# Patient Record
Sex: FEMALE | Race: WHITE | NOT HISPANIC OR LATINO | Employment: FULL TIME | URBAN - METROPOLITAN AREA
[De-identification: names, ages, dates, MRNs, and addresses within clinical notes are randomized per-mention and may not be internally consistent; named-entity substitution may affect disease eponyms.]

---

## 2021-02-01 ENCOUNTER — HISTORICAL (OUTPATIENT)
Dept: ADMINISTRATIVE | Facility: HOSPITAL | Age: 46
End: 2021-02-01

## 2021-02-01 LAB
ALBUMIN SERPL-MCNC: 4.5 GM/DL (ref 3.4–5)
ALBUMIN/GLOB SERPL: 1.32 {RATIO} (ref 1.5–2.5)
ALP SERPL-CCNC: 57 UNIT/L (ref 38–126)
ALT SERPL-CCNC: 18 UNIT/L (ref 7–52)
AST SERPL-CCNC: 20 UNIT/L (ref 15–37)
BILIRUB SERPL-MCNC: 0.5 MG/DL (ref 0.2–1)
BILIRUBIN DIRECT+TOT PNL SERPL-MCNC: 0.1 MG/DL (ref 0–0.5)
BILIRUBIN DIRECT+TOT PNL SERPL-MCNC: 0.4 MG/DL
BUN SERPL-MCNC: 15 MG/DL (ref 7–18)
CALCIUM SERPL-MCNC: 9.9 MG/DL (ref 8.5–10)
CHLORIDE SERPL-SCNC: 99 MMOL/L (ref 98–107)
CHOLEST SERPL-MCNC: 209 MG/DL (ref 0–200)
CHOLEST/HDLC SERPL: 4.5 {RATIO}
CO2 SERPL-SCNC: 27 MMOL/L (ref 21–32)
CREAT SERPL-MCNC: 0.87 MG/DL (ref 0.6–1.3)
DEPRECATED CALCIDIOL+CALCIFEROL SERPL-MC: 20.3 NG/ML (ref 30–80)
GLOBULIN SER-MCNC: 3.4 GM/DL (ref 1.2–3)
GLUCOSE SERPL-MCNC: 83 MG/DL (ref 74–106)
HDLC SERPL-MCNC: 46 MG/DL (ref 35–60)
LDLC SERPL CALC-MCNC: 136 MG/DL (ref 0–129)
POTASSIUM SERPL-SCNC: 4 MMOL/L (ref 3.5–5.1)
PROT SERPL-MCNC: 7.9 GM/DL (ref 6.4–8.2)
SODIUM SERPL-SCNC: 135 MMOL/L (ref 136–145)
TRIGL SERPL-MCNC: 208 MG/DL (ref 30–150)
TSH SERPL-ACNC: 1.29 MIU/ML (ref 0.35–4.94)
VLDLC SERPL CALC-MCNC: 41.6 MG/DL

## 2021-10-08 ENCOUNTER — HISTORICAL (OUTPATIENT)
Dept: ADMINISTRATIVE | Facility: HOSPITAL | Age: 46
End: 2021-10-08

## 2021-11-15 ENCOUNTER — HISTORICAL (OUTPATIENT)
Dept: RADIOLOGY | Facility: HOSPITAL | Age: 46
End: 2021-11-15

## 2022-03-03 ENCOUNTER — HISTORICAL (OUTPATIENT)
Dept: ADMINISTRATIVE | Facility: HOSPITAL | Age: 47
End: 2022-03-03

## 2022-03-03 LAB — VIT B12 SERPL-MCNC: 528 PG/ML (ref 213–816)

## 2022-04-10 ENCOUNTER — HISTORICAL (OUTPATIENT)
Dept: ADMINISTRATIVE | Facility: HOSPITAL | Age: 47
End: 2022-04-10
Payer: COMMERCIAL

## 2022-04-28 VITALS
WEIGHT: 118.38 LBS | BODY MASS INDEX: 22.35 KG/M2 | DIASTOLIC BLOOD PRESSURE: 88 MMHG | HEIGHT: 61 IN | SYSTOLIC BLOOD PRESSURE: 130 MMHG

## 2022-08-17 ENCOUNTER — APPOINTMENT (OUTPATIENT)
Dept: RADIOLOGY | Facility: HOSPITAL | Age: 47
End: 2022-08-17
Payer: COMMERCIAL

## 2022-08-17 DIAGNOSIS — Z91.89 AT HIGH RISK FOR BREAST CANCER: ICD-10-CM

## 2022-08-17 PROCEDURE — 25500020 PHARM REV CODE 255

## 2022-08-17 PROCEDURE — 77049 MRI BREAST C-+ W/CAD BI: CPT | Mod: TC

## 2022-08-17 PROCEDURE — A9577 INJ MULTIHANCE: HCPCS

## 2022-08-17 PROCEDURE — 77049 MRI BREAST W/WO CONTRAST, W/CAD, BILATERAL: ICD-10-PCS | Mod: 26,,, | Performed by: RADIOLOGY

## 2022-08-17 PROCEDURE — 77049 MRI BREAST C-+ W/CAD BI: CPT | Mod: 26,,, | Performed by: RADIOLOGY

## 2022-08-17 RX ADMIN — GADOBENATE DIMEGLUMINE 15 ML: 529 INJECTION, SOLUTION INTRAVENOUS at 12:08

## 2022-08-31 PROBLEM — F41.1 GENERALIZED ANXIETY DISORDER: Status: ACTIVE | Noted: 2022-08-31

## 2022-08-31 PROBLEM — E03.9 HYPOTHYROIDISM: Status: ACTIVE | Noted: 2022-08-31

## 2022-08-31 PROBLEM — E55.9 VITAMIN D DEFICIENCY: Status: ACTIVE | Noted: 2022-08-31

## 2022-08-31 PROBLEM — G43.909 MIGRAINE HEADACHE: Status: ACTIVE | Noted: 2022-08-31

## 2022-08-31 PROBLEM — Z86.16 HISTORY OF SEVERE ACUTE RESPIRATORY SYNDROME CORONAVIRUS 2 (SARS-COV-2) DISEASE: Status: ACTIVE | Noted: 2022-08-31

## 2022-08-31 PROBLEM — R53.83 FATIGUE: Status: ACTIVE | Noted: 2022-08-31

## 2022-08-31 PROBLEM — J30.9 ALLERGIC RHINITIS: Status: ACTIVE | Noted: 2022-08-31

## 2022-11-17 ENCOUNTER — HOSPITAL ENCOUNTER (OUTPATIENT)
Dept: RADIOLOGY | Facility: HOSPITAL | Age: 47
Discharge: HOME | End: 2022-11-17
Payer: COMMERCIAL

## 2022-11-17 DIAGNOSIS — Z12.31 BREAST CANCER SCREENING BY MAMMOGRAM: ICD-10-CM

## 2022-11-17 PROCEDURE — 77063 MAMMO DIGITAL SCREENING BILAT WITH TOMO: ICD-10-PCS | Mod: 26,,, | Performed by: RADIOLOGY

## 2022-11-17 PROCEDURE — 77063 BREAST TOMOSYNTHESIS BI: CPT | Mod: 26,,, | Performed by: RADIOLOGY

## 2022-11-17 PROCEDURE — 77067 SCR MAMMO BI INCL CAD: CPT | Mod: 26,,, | Performed by: RADIOLOGY

## 2022-11-17 PROCEDURE — 77063 BREAST TOMOSYNTHESIS BI: CPT | Mod: TC

## 2022-11-17 PROCEDURE — 77067 SCR MAMMO BI INCL CAD: CPT | Mod: TC

## 2022-11-17 PROCEDURE — 77067 MAMMO DIGITAL SCREENING BILAT WITH TOMO: ICD-10-PCS | Mod: 26,,, | Performed by: RADIOLOGY

## 2022-12-14 NOTE — PROGRESS NOTES
[facility] Breast Surg  Breast Surgical Oncology  New Patient Office Visit - H&P      Referring Provider: Dr. Odin Hicks GYN    PCP: Will James MD     Chief Complaint:   Chief Complaint   Patient presents with    Follow-up     Follow up for MG results. Patient is well, no breast pain or concerns.     Subjective:      Interval History:   2022 She is doing well in the interval. She currently denies any breast issues including rashes, redness, pain, swelling, nipple discharge, or new lumps/masses. She had a Breast MRI in August and [place] [facility] [place] resulted as [place]. The Breast MRI resulted with an incidentally finding of Pectus excavatum.     History of Present Illness:  Gabi Garsia is a pleasant Female patient who initially presents on  22 at 46 Years old for evaluation and assessment of risk for breast cancer based on the Tyrer-Cuzick Breast Cancer Risk Model (>20% indicates elevated lifetime risk). Her lifetime risk is calculated to be: 31.1% v7 and 27.9% v8.     She currently denies any breast issues including rashes, redness, pain, swelling, nipple discharge, or new lumps/masses. Past Medical history includes: anxiety, hypothyroidism, and migraines.    Prevention - We had a brief discussion/education about indications for preventative mastectomy or chemoprevention.  These methods are not recommended for her at this time.    Imagin. 11[place]15/[place] [facility] [place]. No mammographic evidence of malignancy.    2. [place] [facility] [place]  Pectus excavatum is incidentally noted.: NEGATIVE No MR evidence of malignancy in either breast.RECOMMENDATIONS:  Continued annual mammography and supplemental breast screening with dynamic contrast enhanced breast MRI is recommended for this high-risk patient, preferably alternating mammography and MRI every 6 months.Routine annual screening mammography is due in 3 months unless clinical signs or  symptoms warrant earlier evaluation (November 2022).      3. 11/21/2022 BL SCR MG at [facility]- NEGATIVE. There is no mammographic evidence of malignancy.        Pathology:   N/a     OB / GYN History   Menarche Onset: 15  Menopause: Pre  Hormonal birth control (duration): 20years  Pregnancies: 4  Age at first child birth: 38  Child births: 2  Breastfeeding duration:  7months total  Hysterectomy: no  Oophorectomy: no  HRT: no     Family History of Cancer (& age at diagnosis):   -Sister Breast Cancer at age 59  - Grandmother maternal Breast cancer at age 55-zoila      Lifestyle:  Smoker: Smoking Status      Never (less than 100 in lifetime)  Height and Weight: 5 feet 2 inches. 119 lbs  ETOH consumption: yes, a couple times a year one glass of wine     Other:  # of breast biopsies (when and pathology results): none  MG breast density: BIRADS C, heterogenously dense  Prior thoracic RT: none  Genetic testing: none  Ashkenazi Baptist descent: No    Other History:     Past Medical History:   Diagnosis Date    Anxiety disorder, unspecified     Fatigue     History of COVID-19     Hypothyroidism, unspecified     Migraine     Other seasonal allergic rhinitis     Vitamin D deficiency         Past Surgical History:   Procedure Laterality Date    DILATION AND CURETTAGE OF UTERUS          Social History     Socioeconomic History    Marital status:    Tobacco Use    Smoking status: Never    Smokeless tobacco: Never   Substance and Sexual Activity    Drug use: Never        Immunization History   Administered Date(s) Administered    COVID-19, MRNA, LN-S, PF (Pfizer) (Purple Cap) 03/02/2021, 03/23/2021    Influenza (FLUBLOK) - Quadrivalent - Recombinant - PF *Preferred* (egg allergy) 11/10/2022    Influenza - Quadrivalent - PF *Preferred* (6 months and older) 08/12/2018, 09/19/2019, 12/17/2020    Tdap 07/05/2018        Medications/Allergies:    Current Outpatient Medications on File Prior to Visit   Medication Sig Dispense Refill     ergocalciferol (ERGOCALCIFEROL) 50,000 unit Cap Take 1 capsule (50,000 Units total) by mouth every 7 days. 12 capsule 1    fexofenadine (ALLEGRA) 180 MG tablet Take 180 mg by mouth once daily.      fluticasone propionate (FLONASE) 50 mcg/actuation nasal spray 1 spray by Each Nostril route 2 (two) times daily.      NP THYROID 30 mg Tab       butalbital-aspirin-caffeine -40 mg (FIORINAL) -40 mg Cap Take 1 capsule by mouth every 4 (four) hours as needed.      [DISCONTINUED] amitriptyline (ELAVIL) 10 MG tablet Take 1 tablet (10 mg total) by mouth nightly as needed for Insomnia. 7 tablet 1    [DISCONTINUED] ergocalciferol (ERGOCALCIFEROL) 50,000 unit Cap Take 50,000 Units by mouth every 7 days.      [DISCONTINUED] levothyroxine (SYNTHROID) 50 MCG tablet Take 50 mcg by mouth every morning.       No current facility-administered medications on file prior to visit.        Review of patient's allergies indicates:   Allergen Reactions    Sulfa (sulfonamide antibiotics) Rash        Review of Systems:      Comprehensive ROS normal except: see HPI       Objective/Physical Exam     Vitals:    12/22/22 1305   BP: 129/79   Pulse: 99   Resp: 18   Temp: 97.4 °F (36.3 °C)        General: The patient is awake, alert and oriented times three. The patient is well nourished and in no acute distress.  Neck: There is no evidence of palpable cervical, supraclavicular or axillary adenopathy. The neck is supple.   Musculoskeletal: The patient has a normal range of motion of her bilateral upper extremities.  Chest: Examination of the chest wall fails to reveal any obvious abnormalities. Nonlabored breathing, symmetric expansion.  Breast:  Right: Examination of right breast fails to reveal any dominant masses or areas of significant focal nodularity. The nipple is everted without evidence of discharge. There is no skin dimpling with movement of the pectoralis. There are no significant skin changes overlying the breast.   Left:  Examination of the left breast fails to reveal any dominant masses or areas of significant focal nodularity. The nipple is everted without evidence of discharge. There is no skin dimpling with movement of the pectoralis. There are no significant skin changes overlying the breast.  Abdomen: The abdomen is soft, flat, nontender and nondistended.  Integumentary: no rashes or skin lesions present  Neurologic: cranial nerves intact, no signs of peripheral neurological deficit, motor/sensory function intact       Assessment and Plan     Patient Active Problem List   Diagnosis    Vitamin D deficiency    Migraine headache    Hypothyroidism    Fatigue    History of severe acute respiratory syndrome coronavirus 2 (SARS-CoV-2) disease    Allergic rhinitis    Generalized anxiety disorder        Gabi was seen today for follow-up.    Diagnoses and all orders for this visit:    At high risk for breast cancer  -     MRI Breast w/wo Contrast, w/CAD, Bilateral; Future    Family history of breast cancer  -     MRI Breast w/wo Contrast, w/CAD, Bilateral; Future    Screening mammogram for breast cancer  -     Mammo Digital Screening Bilat w/ Joel; Future       ---------------------------------------------------------------------------------------------------------------    PLAN:     1. Lifestyle - Healthy lifestyle guidelines were reviewed. She was encouraged to engage in regular exercise, maintain a healthy body weight, and avoid excessive alcohol consumption. Healthy nutritional guidelines were also discussed. Self-breast examination was reviewed with the patient in detail and she was encouraged to perform this on a monthly basis.      2. MRI in August     3. SCR MG in December to try to spread out the patients imaging to a 6 month interval.     4. RTC  January 5.According to NCCN guidelines, she currently meets criteria for genetic testing. Patient states she will think about it and let me know at the next visit what she  decides.    6. Continue to see OB/GYN for CBE. Recommend two CBE a year. One with us and one with your OB/GYN Provider. We recommend them to be at a six month interval.  She states she sees her GYN in the summers.     7. Follow up with PCP regarding incidental finding of Pectus excavatum. MARIE Valenzuela to send recent office notes and imaging (including breast MRI to his office).     All of her questions were answered.      JOSELIN Del Real

## 2022-12-22 ENCOUNTER — OFFICE VISIT (OUTPATIENT)
Dept: SURGERY | Facility: CLINIC | Age: 47
End: 2022-12-22
Payer: COMMERCIAL

## 2022-12-22 VITALS
OXYGEN SATURATION: 100 % | HEART RATE: 99 BPM | SYSTOLIC BLOOD PRESSURE: 129 MMHG | DIASTOLIC BLOOD PRESSURE: 79 MMHG | TEMPERATURE: 97 F | WEIGHT: 119.38 LBS | HEIGHT: 61 IN | RESPIRATION RATE: 18 BRPM | BODY MASS INDEX: 22.54 KG/M2

## 2022-12-22 DIAGNOSIS — Z12.31 SCREENING MAMMOGRAM FOR BREAST CANCER: ICD-10-CM

## 2022-12-22 DIAGNOSIS — Z80.3 FAMILY HISTORY OF BREAST CANCER: ICD-10-CM

## 2022-12-22 DIAGNOSIS — Z91.89 AT HIGH RISK FOR BREAST CANCER: Primary | ICD-10-CM

## 2022-12-22 PROCEDURE — 1159F PR MEDICATION LIST DOCUMENTED IN MEDICAL RECORD: ICD-10-PCS | Mod: CPTII,S$GLB,,

## 2022-12-22 PROCEDURE — 99999 PR PBB SHADOW E&M-EST. PATIENT-LVL V: CPT | Mod: PBBFAC,,,

## 2022-12-22 PROCEDURE — 99999 PR PBB SHADOW E&M-EST. PATIENT-LVL V: ICD-10-PCS | Mod: PBBFAC,,,

## 2022-12-22 PROCEDURE — 3008F PR BODY MASS INDEX (BMI) DOCUMENTED: ICD-10-PCS | Mod: CPTII,S$GLB,,

## 2022-12-22 PROCEDURE — 99213 PR OFFICE/OUTPT VISIT, EST, LEVL III, 20-29 MIN: ICD-10-PCS | Mod: S$GLB,,,

## 2022-12-22 PROCEDURE — 99213 OFFICE O/P EST LOW 20 MIN: CPT | Mod: S$GLB,,,

## 2022-12-22 PROCEDURE — 3078F DIAST BP <80 MM HG: CPT | Mod: CPTII,S$GLB,,

## 2022-12-22 PROCEDURE — 3074F PR MOST RECENT SYSTOLIC BLOOD PRESSURE < 130 MM HG: ICD-10-PCS | Mod: CPTII,S$GLB,,

## 2022-12-22 PROCEDURE — 1159F MED LIST DOCD IN RCRD: CPT | Mod: CPTII,S$GLB,,

## 2022-12-22 PROCEDURE — 3078F PR MOST RECENT DIASTOLIC BLOOD PRESSURE < 80 MM HG: ICD-10-PCS | Mod: CPTII,S$GLB,,

## 2022-12-22 PROCEDURE — 3074F SYST BP LT 130 MM HG: CPT | Mod: CPTII,S$GLB,,

## 2022-12-22 PROCEDURE — 3008F BODY MASS INDEX DOCD: CPT | Mod: CPTII,S$GLB,,

## 2022-12-22 RX ORDER — MINERAL OIL
180 ENEMA (ML) RECTAL DAILY
COMMUNITY

## 2023-03-16 DIAGNOSIS — R10.9 FLANK PAIN: Primary | ICD-10-CM

## 2023-03-16 DIAGNOSIS — R31.9 HEMATURIA: ICD-10-CM

## 2023-07-06 DIAGNOSIS — Z80.3 FAMILY HISTORY OF BREAST CANCER: ICD-10-CM

## 2023-07-06 DIAGNOSIS — Z12.31 SCREENING MAMMOGRAM FOR BREAST CANCER: Primary | ICD-10-CM

## 2023-07-06 DIAGNOSIS — Z91.89 AT HIGH RISK FOR BREAST CANCER: ICD-10-CM

## 2023-11-22 ENCOUNTER — HOSPITAL ENCOUNTER (OUTPATIENT)
Dept: RADIOLOGY | Facility: HOSPITAL | Age: 48
Discharge: HOME | End: 2023-11-22
Payer: COMMERCIAL

## 2023-11-22 VITALS — BODY MASS INDEX: 22.45 KG/M2 | HEIGHT: 62 IN | WEIGHT: 122 LBS

## 2023-11-22 DIAGNOSIS — Z80.3 FAMILY HISTORY OF BREAST CANCER: ICD-10-CM

## 2023-11-22 DIAGNOSIS — Z91.89 AT HIGH RISK FOR BREAST CANCER: ICD-10-CM

## 2023-11-22 DIAGNOSIS — Z12.31 SCREENING MAMMOGRAM FOR BREAST CANCER: ICD-10-CM

## 2023-11-22 PROCEDURE — 77063 BREAST TOMOSYNTHESIS BI: CPT | Mod: 26,,, | Performed by: STUDENT IN AN ORGANIZED HEALTH CARE EDUCATION/TRAINING PROGRAM

## 2023-11-22 PROCEDURE — 77063 MAMMO DIGITAL SCREENING BILAT WITH TOMO: ICD-10-PCS | Mod: 26,,, | Performed by: STUDENT IN AN ORGANIZED HEALTH CARE EDUCATION/TRAINING PROGRAM

## 2023-11-22 PROCEDURE — 76641 US BREAST SCREENING BILATERAL: ICD-10-PCS | Mod: 26,50,, | Performed by: STUDENT IN AN ORGANIZED HEALTH CARE EDUCATION/TRAINING PROGRAM

## 2023-11-22 PROCEDURE — 77067 SCR MAMMO BI INCL CAD: CPT | Mod: 26,,, | Performed by: STUDENT IN AN ORGANIZED HEALTH CARE EDUCATION/TRAINING PROGRAM

## 2023-11-22 PROCEDURE — 77067 MAMMO DIGITAL SCREENING BILAT WITH TOMO: ICD-10-PCS | Mod: 26,,, | Performed by: STUDENT IN AN ORGANIZED HEALTH CARE EDUCATION/TRAINING PROGRAM

## 2023-11-22 PROCEDURE — 77067 SCR MAMMO BI INCL CAD: CPT | Mod: TC

## 2023-11-22 PROCEDURE — 76641 ULTRASOUND BREAST COMPLETE: CPT | Mod: TC,50

## 2023-11-22 PROCEDURE — 76641 ULTRASOUND BREAST COMPLETE: CPT | Mod: 26,50,, | Performed by: STUDENT IN AN ORGANIZED HEALTH CARE EDUCATION/TRAINING PROGRAM

## 2024-01-03 ENCOUNTER — OFFICE VISIT (OUTPATIENT)
Dept: SURGERY | Facility: CLINIC | Age: 49
End: 2024-01-03
Payer: COMMERCIAL

## 2024-01-03 VITALS
TEMPERATURE: 98 F | HEART RATE: 74 BPM | HEIGHT: 62 IN | OXYGEN SATURATION: 100 % | WEIGHT: 120.19 LBS | SYSTOLIC BLOOD PRESSURE: 132 MMHG | DIASTOLIC BLOOD PRESSURE: 84 MMHG | BODY MASS INDEX: 22.12 KG/M2 | RESPIRATION RATE: 18 BRPM

## 2024-01-03 DIAGNOSIS — Z91.89 AT HIGH RISK FOR BREAST CANCER: ICD-10-CM

## 2024-01-03 DIAGNOSIS — Z80.3 FAMILY HISTORY OF BREAST CANCER: ICD-10-CM

## 2024-01-03 DIAGNOSIS — Z12.39 ENCOUNTER FOR BREAST CANCER SCREENING OTHER THAN MAMMOGRAM: ICD-10-CM

## 2024-01-03 DIAGNOSIS — Z12.31 SCREENING MAMMOGRAM FOR BREAST CANCER: Primary | ICD-10-CM

## 2024-01-03 PROCEDURE — 99214 OFFICE O/P EST MOD 30 MIN: CPT | Mod: S$GLB,,, | Performed by: STUDENT IN AN ORGANIZED HEALTH CARE EDUCATION/TRAINING PROGRAM

## 2024-01-03 PROCEDURE — 1160F RVW MEDS BY RX/DR IN RCRD: CPT | Mod: CPTII,S$GLB,, | Performed by: STUDENT IN AN ORGANIZED HEALTH CARE EDUCATION/TRAINING PROGRAM

## 2024-01-03 PROCEDURE — 99999 PR PBB SHADOW E&M-EST. PATIENT-LVL IV: CPT | Mod: PBBFAC,,, | Performed by: STUDENT IN AN ORGANIZED HEALTH CARE EDUCATION/TRAINING PROGRAM

## 2024-01-03 PROCEDURE — 3075F SYST BP GE 130 - 139MM HG: CPT | Mod: CPTII,S$GLB,, | Performed by: STUDENT IN AN ORGANIZED HEALTH CARE EDUCATION/TRAINING PROGRAM

## 2024-01-03 PROCEDURE — 3079F DIAST BP 80-89 MM HG: CPT | Mod: CPTII,S$GLB,, | Performed by: STUDENT IN AN ORGANIZED HEALTH CARE EDUCATION/TRAINING PROGRAM

## 2024-01-03 PROCEDURE — 3008F BODY MASS INDEX DOCD: CPT | Mod: CPTII,S$GLB,, | Performed by: STUDENT IN AN ORGANIZED HEALTH CARE EDUCATION/TRAINING PROGRAM

## 2024-01-03 PROCEDURE — 1159F MED LIST DOCD IN RCRD: CPT | Mod: CPTII,S$GLB,, | Performed by: STUDENT IN AN ORGANIZED HEALTH CARE EDUCATION/TRAINING PROGRAM

## 2024-01-03 NOTE — PROGRESS NOTES
[facility]  Breast Surgical Oncology  Est Patient Office Visit - H&P      Referring Provider: Dr. Odin Hicks GYN    PCP: Will James MD     Chief Complaint:   Chief Complaint   Patient presents with    Follow-up     1 year High Risk follow up     Subjective:      Interval History:   Gabi Garsia is a 48 y.o.female who is here 2024 or high-risk follow-up.  She is doing well.  She denies any changes with her breasts including new masses, skin changes, skin lesions, nipple discharge or retraction.  She had her bilateral screening mammogram and US in November which did not show any suspicious findings.  She did not get her MRI this past summer because it was very expensive. No changes in her family history since her last visit. She does not exercise regularly. States her periods have gotten more irregular. Denies any menopausal symptoms.     History of Present Illness:  Gabi Garsia is a pleasant Female patient who initially presents on 22 at 46 Years old for evaluation and assessment of risk for breast cancer based on the Tyrer-Cuzick Breast Cancer Risk Model (>20% indicates elevated lifetime risk). Her lifetime risk is calculated to be: 31.1% v7 and 27.9% v8.     She currently denies any breast issues including rashes, redness, pain, swelling, nipple discharge, or new lumps/masses. Past Medical history includes: anxiety, hypothyroidism, and migraines.    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     Imagin. 11/15/2021 SCR MG at [facility] [place]- Negative. No mammographic evidence of malignancy.    2. 2022 Breast MRI at [facility] [place]-  Pectus excavatum is incidentally noted.: NEGATIVE No MR evidence of malignancy in either breast.RECOMMENDATIONS:  Continued annual mammography and supplemental breast screening with dynamic contrast enhanced breast MRI is recommended for this high-risk patient, preferably alternating mammography and MRI every 6 months.Routine  annual screening mammography is due in 3 months unless clinical signs or symptoms warrant earlier evaluation (November 2022).      3. 11/21/2022 BL SCR MG at [facility]- NEGATIVE. There is no mammographic evidence of malignancy.     4. BL screening mammogram and complete breast US 11/22/23: Density C. No masses in either breast or suspicious findings. BIRADS 1.      I have reviewed the imaging and agree with the radiologists interpretation. I have discussed these results with the patient.      Pathology:   N/a     OB / GYN History   Menarche Onset: 15  Menopause: Pre  Hormonal birth control (duration): 20years  Pregnancies: 4  Age at first child birth: 38  Child births: 2  Breastfeeding duration:  7months total  Hysterectomy: no  Oophorectomy: no  HRT: no     Family History of Cancer (& age at diagnosis):  - Sister Breast Cancer at age 59  - Grandmother maternal Breast cancer at age 55-zoila      Lifestyle:  Smoker: Smoking Status      Never (less than 100 in lifetime)  Height and Weight: 5 feet 2 inches. 119 lbs  ETOH consumption: yes, a couple times a year one glass of wine     Other:  # of breast biopsies (when and pathology results): none  MG breast density: BIRADS C, heterogenously dense  Prior thoracic RT: none  Genetic testing: none  Ashkenazi Jainism descent: No    Other History:     Past Medical History:   Diagnosis Date    Anxiety disorder, unspecified     Fatigue     History of COVID-19     Hypothyroidism, unspecified     Migraine     Other seasonal allergic rhinitis     Vertigo, unspecified     Vitamin D deficiency         Past Surgical History:   Procedure Laterality Date    DILATION AND CURETTAGE OF UTERUS          Social History     Socioeconomic History    Marital status:    Tobacco Use    Smoking status: Never    Smokeless tobacco: Never   Substance and Sexual Activity    Alcohol use: Not Currently    Drug use: Never    Sexual activity: Not Currently     Partners: Male        Immunization History    Administered Date(s) Administered    COVID-19, MRNA, LN-S, PF (Pfizer) (Purple Cap) 03/02/2021, 03/23/2021    Influenza (FLUBLOK) - Quadrivalent - Recombinant - PF *Preferred* (egg allergy) 11/10/2022    Influenza - Quadrivalent - PF *Preferred* (6 months and older) 08/12/2018, 09/19/2019, 12/17/2020    Tdap 07/05/2018        Medications/Allergies:    Current Outpatient Medications on File Prior to Visit   Medication Sig Dispense Refill    ergocalciferol (ERGOCALCIFEROL) 50,000 unit Cap       fexofenadine (ALLEGRA) 180 MG tablet Take 180 mg by mouth once daily.      fluticasone propionate (FLONASE) 50 mcg/actuation nasal spray 1 spray by Each Nostril route 2 (two) times daily.      [DISCONTINUED] butalbital-aspirin-caffeine -40 mg (FIORINAL) -40 mg Cap Take 1 capsule by mouth every 4 (four) hours as needed.       No current facility-administered medications on file prior to visit.        Review of patient's allergies indicates:   Allergen Reactions    Sulfa (sulfonamide antibiotics) Rash        Review of Systems:      Comprehensive ROS normal except: see HPI       Objective/Physical Exam     Vitals:    01/03/24 1544   BP: 132/84   Pulse: 74   Resp: 18   Temp: 97.5 °F (36.4 °C)          General: The patient is awake, alert and oriented times three. The patient is well nourished and in no acute distress.  Neck: There is no evidence of palpable cervical, supraclavicular or axillary adenopathy. The neck is supple.   Musculoskeletal: The patient has a normal range of motion of her bilateral upper extremities.  Heart: RRR  Lungs: Clear bilaterally, no increased work of breathing.  Breast Exam: The patient's breasts are symmetric.    Right: On inspection, there is no skin dimpling, rashes, retraction, erythema or skin abnormalities. The nipple areolar complex is normal. The breast moves normally with movement of the pectoralis muscle.  On palpation there are no dominant masses.  There is no warmth, tenderness  or fluctuance in the breast. There is no nipple discharge.  Left: On inspection, there is no skin dimpling, rashes, retraction, erythema or skin abnormalities. The nipple areolar complex is normal. The breast moves normally with movement of the pectoralis muscle.  On palpation there are no dominant masses. There is no warmth, tenderness or fluctuance in the breast. There is no nipple discharge.       Assessment and Plan     Gabi was seen today for follow-up.    Diagnoses and all orders for this visit:    Screening mammogram for breast cancer  -     Mammo Digital Screening Bilat w/ Joel; Future    Family history of breast cancer  -     MRI Breast w/wo Contrast, w/CAD, Bilateral; Future    At high risk for breast cancer  -     MRI Breast w/wo Contrast, w/CAD, Bilateral; Future    Encounter for breast cancer screening other than mammogram  -     MRI Breast w/wo Contrast, w/CAD, Bilateral; Future         Gabi Garsia is a 48 y.o.female who is here today for high risk follow up.  There are no concerning findings on her breast exam or on recent imaging.  Today Gabi Garsia was plugged into the [employer] risk calculator and her calculated risk of breast cancer based on Tyrer - Cuzick Breast Cancer Risk Model was 31%.  She understands that >20% is considered high risk.  Today we again discussed risk factors associated with the development of breast cancer and risk reduction strategies.       PLAN:     Lifestyle - Healthy lifestyle guidelines were reviewed. She was encouraged to engage in regular exercise, maintain a healthy body weight, and avoid excessive alcohol consumption. Healthy nutritional guidelines were also discussed.     Surveillance - She currently does not desire high risk screening with alternating SCR MGs and breast MRIs every 6 months. Instead, we will proceed surveilling with mammogram yearly and MRI every other year. She switched insurance so wants to see how much they  will cover for her MRI this year. Will order MRI for May 2024 and Mammogram in Nov 2024. We also discussed that if MRI is too expensive we could switch to [facility].     Recommend continuing with 2 clinical breast exams/year, one with her OBGYN and one with the breast center       The patient was encouraged to continue her self breast exam. If she notes any changes or has any concerns with her breast in the interim she was encouraged to call the breast center to schedule an appointment as soon as possible. All of her questions were answered.     Donita James MD   Breast Surgical Oncology     I spent a total of 30 minutes on the day of the visit.  This includes face to face time and non-face to face time preparing to see the patient (eg, review of tests), obtaining and/or reviewing separately obtained history, documenting clinical information in the electronic or other health record, independently interpreting results and communicating results to the patient/family/caregiver, or care coordinator.

## 2024-05-16 ENCOUNTER — TELEPHONE (OUTPATIENT)
Dept: SURGERY | Facility: CLINIC | Age: 49
End: 2024-05-16
Payer: COMMERCIAL

## 2024-05-16 DIAGNOSIS — Z12.39 SCREENING FOR BREAST CANCER USING NON-MAMMOGRAM MODALITY: Primary | ICD-10-CM

## 2024-05-16 NOTE — TELEPHONE ENCOUNTER
Patient called to cancel breast MRI.  States its too expensive even with her insurance.  She is requesting an additional breast ultrasound with her screening mammogram.  Screening breast mammogram with ultrasound scheduled for 11/25/24.

## 2024-08-20 PROBLEM — E53.8 B12 DEFICIENCY: Status: ACTIVE | Noted: 2024-08-20

## 2024-10-28 PROBLEM — E78.2 MIXED HYPERLIPIDEMIA: Status: ACTIVE | Noted: 2024-10-28

## 2024-11-25 ENCOUNTER — HOSPITAL ENCOUNTER (OUTPATIENT)
Dept: RADIOLOGY | Facility: HOSPITAL | Age: 49
Discharge: HOME | End: 2024-11-25
Attending: STUDENT IN AN ORGANIZED HEALTH CARE EDUCATION/TRAINING PROGRAM
Payer: COMMERCIAL

## 2024-11-25 DIAGNOSIS — Z12.31 SCREENING MAMMOGRAM FOR BREAST CANCER: ICD-10-CM

## 2024-11-25 DIAGNOSIS — Z12.39 SCREENING FOR BREAST CANCER USING NON-MAMMOGRAM MODALITY: ICD-10-CM

## 2024-11-25 PROCEDURE — 77063 BREAST TOMOSYNTHESIS BI: CPT | Mod: 26,,, | Performed by: STUDENT IN AN ORGANIZED HEALTH CARE EDUCATION/TRAINING PROGRAM

## 2024-11-25 PROCEDURE — 76641 ULTRASOUND BREAST COMPLETE: CPT | Mod: TC,50

## 2024-11-25 PROCEDURE — 76641 ULTRASOUND BREAST COMPLETE: CPT | Mod: 26,,, | Performed by: STUDENT IN AN ORGANIZED HEALTH CARE EDUCATION/TRAINING PROGRAM

## 2024-11-25 PROCEDURE — 77067 SCR MAMMO BI INCL CAD: CPT | Mod: 26,,, | Performed by: STUDENT IN AN ORGANIZED HEALTH CARE EDUCATION/TRAINING PROGRAM

## 2024-11-25 PROCEDURE — 77067 SCR MAMMO BI INCL CAD: CPT | Mod: TC

## 2025-01-06 ENCOUNTER — OFFICE VISIT (OUTPATIENT)
Dept: SURGERY | Facility: CLINIC | Age: 50
End: 2025-01-06
Payer: COMMERCIAL

## 2025-01-06 VITALS
SYSTOLIC BLOOD PRESSURE: 114 MMHG | DIASTOLIC BLOOD PRESSURE: 78 MMHG | OXYGEN SATURATION: 100 % | BODY MASS INDEX: 21.71 KG/M2 | RESPIRATION RATE: 18 BRPM | TEMPERATURE: 98 F | HEIGHT: 62 IN | WEIGHT: 118 LBS | HEART RATE: 75 BPM

## 2025-01-06 DIAGNOSIS — Z12.31 SCREENING MAMMOGRAM FOR BREAST CANCER: ICD-10-CM

## 2025-01-06 DIAGNOSIS — Z91.89 AT HIGH RISK FOR BREAST CANCER: ICD-10-CM

## 2025-01-06 DIAGNOSIS — Z80.3 FAMILY HISTORY OF BREAST CANCER: ICD-10-CM

## 2025-01-06 DIAGNOSIS — Z12.39 SCREENING FOR BREAST CANCER USING NON-MAMMOGRAM MODALITY: Primary | ICD-10-CM

## 2025-01-06 DIAGNOSIS — Z12.39 ENCOUNTER FOR BREAST CANCER SCREENING OTHER THAN MAMMOGRAM: ICD-10-CM

## 2025-01-06 PROCEDURE — 99213 OFFICE O/P EST LOW 20 MIN: CPT | Mod: S$GLB,,, | Performed by: STUDENT IN AN ORGANIZED HEALTH CARE EDUCATION/TRAINING PROGRAM

## 2025-01-06 PROCEDURE — 3074F SYST BP LT 130 MM HG: CPT | Mod: CPTII,S$GLB,, | Performed by: STUDENT IN AN ORGANIZED HEALTH CARE EDUCATION/TRAINING PROGRAM

## 2025-01-06 PROCEDURE — 3008F BODY MASS INDEX DOCD: CPT | Mod: CPTII,S$GLB,, | Performed by: STUDENT IN AN ORGANIZED HEALTH CARE EDUCATION/TRAINING PROGRAM

## 2025-01-06 PROCEDURE — 3078F DIAST BP <80 MM HG: CPT | Mod: CPTII,S$GLB,, | Performed by: STUDENT IN AN ORGANIZED HEALTH CARE EDUCATION/TRAINING PROGRAM

## 2025-01-06 PROCEDURE — 99999 PR PBB SHADOW E&M-EST. PATIENT-LVL IV: CPT | Mod: PBBFAC,,, | Performed by: STUDENT IN AN ORGANIZED HEALTH CARE EDUCATION/TRAINING PROGRAM

## 2025-01-06 PROCEDURE — 1160F RVW MEDS BY RX/DR IN RCRD: CPT | Mod: CPTII,S$GLB,, | Performed by: STUDENT IN AN ORGANIZED HEALTH CARE EDUCATION/TRAINING PROGRAM

## 2025-01-06 PROCEDURE — 1159F MED LIST DOCD IN RCRD: CPT | Mod: CPTII,S$GLB,, | Performed by: STUDENT IN AN ORGANIZED HEALTH CARE EDUCATION/TRAINING PROGRAM

## 2025-01-06 NOTE — PROGRESS NOTES
[facility] - [facility]  Breast Surgical Oncology  Est Patient Office Visit - H&P      Referring Provider: Dr. Odin Hicks GYN    PCP: Will James MD     Chief Complaint:   Chief Complaint   Patient presents with    Follow-up     Patient reports no breast related concerns      Subjective:      Interval History:   Gabi Garsia is a 49 y.o.female who is here 2025 or high-risk follow-up.   She denies any changes with her breasts including new masses, skin changes, skin lesions, nipple discharge or retraction.  She had bilateral screening mammogram and US in November which did not show any suspicious findings.  She was having some dizziness recently and saw a cardiologist and has an echo scheduled for the end of the month.   No changes in her family breast cancer history since her last visit, but her son was recently diagnosed with DMD.     History of Present Illness:  Gabi Garsia is a pleasant Female patient who initially presents on 22 at 46 Years old for evaluation and assessment of risk for breast cancer based on the Tyrer-Cuzick Breast Cancer Risk Model (>20% indicates elevated lifetime risk). Her lifetime risk is calculated to be: 31.1% v7 and 27.9% v8.     She currently denies any breast issues including rashes, redness, pain, swelling, nipple discharge, or new lumps/masses. Past Medical history includes: anxiety, hypothyroidism, and migraines.    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     Imagin/15/2021 [place]CR [place] [facility] [place]. No mammographic evidence of malignancy.  2022 Breast MRI at [facility] [place]-  Pectus excavatum is incidentally noted.: NEGATIVE No MR evidence of malignancy in either breast.RECOMMENDATIONS:  Continued annual mammography and supplemental breast screening with dynamic contrast enhanced breast MRI is recommended for this high-risk patient, preferably alternating mammography and MRI every 6 months.Routine annual screening  mammography is due in 3 months unless clinical signs or symptoms warrant earlier evaluation (November 2022).    11/21/2022 BL SCR MG at [facility]- NEGATIVE. There is no mammographic evidence of malignancy.   BL screening mammogram and complete breast US 11/22/23: Density C. No masses in either breast or suspicious findings. BIRADS 1.   Bilateral screening mammogram 11/25/2024:  Density C.  No suspicious findings.  BI-RADS 1.     I have reviewed the imaging and agree with the radiologists interpretation. I have discussed these results with the patient.      Pathology:   N/a     OB / GYN History   Menarche Onset: 15  Menopause: Pre  Hormonal birth control (duration): 20years  Pregnancies: 4  Age at first child birth: 38  Child births: 2  Breastfeeding duration:  7months total  Hysterectomy: no  Oophorectomy: no  HRT: no     Family History of Cancer (& age at diagnosis):  - Sister Breast Cancer at age 59  - Grandmother maternal Breast cancer at age 55-zoila      Lifestyle:  Smoker: Smoking Status      Never (less than 100 in lifetime)  Height and Weight: 5 feet 2 inches. 119 lbs  ETOH consumption: yes, a couple times a year one glass of wine     Other:  # of breast biopsies (when and pathology results): none  MG breast density: BIRADS C, heterogenously dense  Prior thoracic RT: none  Genetic testing: none  Ashkenazi Sikhism descent: No    Other History:     Past Medical History:   Diagnosis Date    Anxiety disorder, unspecified     Fatigue     History of COVID-19     Migraine     Other seasonal allergic rhinitis     Vertigo, unspecified     Vitamin D deficiency         Past Surgical History:   Procedure Laterality Date    DILATION AND CURETTAGE OF UTERUS          Social History     Socioeconomic History    Marital status:    Tobacco Use    Smoking status: Never    Smokeless tobacco: Never   Substance and Sexual Activity    Alcohol use: Not Currently    Drug use: Never    Sexual activity: Not Currently     Partners: Male         Immunization History   Administered Date(s) Administered    COVID-19, MRNA, LN-S, PF (Pfizer) (Purple Cap) 03/02/2021, 03/23/2021    Influenza (FLUBLOK) - Quadrivalent - Recombinant - PF *Preferred* (egg allergy) 11/10/2022    Influenza - Quadrivalent - PF *Preferred* (6 months and older) 08/12/2018, 09/19/2019, 12/17/2020    Tdap 07/05/2018        Medications/Allergies:    Current Outpatient Medications on File Prior to Visit   Medication Sig Dispense Refill    ergocalciferol (ERGOCALCIFEROL) 50,000 unit Cap Take 1 capsule (50,000 Units total) by mouth every 7 days. 50 capsule 0    fexofenadine (ALLEGRA) 180 MG tablet Take 180 mg by mouth once daily.      fluticasone propionate (FLONASE) 50 mcg/actuation nasal spray 1 spray by Each Nostril route 2 (two) times daily.      MULTIVITAMIN ORAL Take by mouth.      CYANOCOBALAMIN, VITAMIN B-12, INJ Inject as directed every 30 days. (Patient not taking: Reported on 1/6/2025)      terbinafine HCL (LAMISIL) 250 mg tablet Take 1 tablet (250 mg total) by mouth once daily. 90 tablet 0     No current facility-administered medications on file prior to visit.        Review of patient's allergies indicates:   Allergen Reactions    Sulfa (sulfonamide antibiotics) Rash        Review of Systems:      Comprehensive ROS normal except: see HPI       Objective/Physical Exam     Vitals:    01/06/25 1543   BP: 114/78   Pulse: 75   Resp: 18   Temp: 97.6 °F (36.4 °C)       General: The patient is awake, alert and oriented times three. The patient is well nourished and in no acute distress.  Neck: There is no evidence of palpable cervical, supraclavicular or axillary adenopathy. The neck is supple.   Musculoskeletal: The patient has a normal range of motion of her bilateral upper extremities.  Heart: RRR  Lungs: Clear bilaterally, no increased work of breathing.  Breast Exam: The patient's breasts are symmetric.    Right: On inspection, there is no skin dimpling, rashes, retraction,  erythema or skin abnormalities. The nipple areolar complex is normal. The breast moves normally with movement of the pectoralis muscle.  On palpation there are no dominant masses.  There is no warmth, tenderness or fluctuance in the breast. There is no nipple discharge.  Left: On inspection, there is no skin dimpling, rashes, retraction, erythema or skin abnormalities. The nipple areolar complex is normal. The breast moves normally with movement of the pectoralis muscle.  On palpation there are no dominant masses. There is no warmth, tenderness or fluctuance in the breast. There is no nipple discharge.       Assessment and Plan     Gabi was seen today for follow-up.    Diagnoses and all orders for this visit:    Screening for breast cancer using non-mammogram modality  -     US BREAST SCREENING BILATERAL; Future    Screening mammogram for breast cancer  -     Mammo Digital Screening Bilat w/ Joel; Future    Family history of breast cancer  -     MRI Screening Breast W/WO Contrast, W/CAD, Tra; Future    At high risk for breast cancer  -     MRI Screening Breast W/WO Contrast, W/CAD, Tra; Future    Encounter for breast cancer screening other than mammogram  -     MRI Screening Breast W/WO Contrast, W/CAD, Tra; Future        Cherjohnny Garsia is a 49 y.o.female who is here today for high risk follow up.  There are no concerning findings on her breast exam or on recent imaging.  Her calculated risk of breast cancer based on Tyrer - Cuzick Breast Cancer Risk Model is 31%.  She understands that >20% is considered high risk.  Today we again discussed risk factors associated with the development of breast cancer and risk reduction strategies.       PLAN:     Lifestyle - Healthy lifestyle guidelines were reviewed. She was encouraged to engage in regular exercise, maintain a healthy body weight, and avoid excessive alcohol consumption. Healthy nutritional guidelines were also discussed.     Surveillance - She desires  undergoing high risk screening with annual screening mammograms and breast MRIs. In the absence of significant clinical findings in the interval, I recommend MRI in May and MG/US in Nov 2025.  Her insurance changed again and if MRI is still too expensive she will cancel it.      Recommend continuing with 2 clinical breast exams/year, one with her OBGYN and one with the breast center.       The patient was encouraged to continue her self breast exam. If she notes any changes or has any concerns with her breast in the interim she was encouraged to call the breast center to schedule an appointment as soon as possible. All of her questions were answered.     Donita James MD   Breast Surgical Oncology     I spent a total of 20 minutes on the day of the visit.  This includes face to face time and non-face to face time preparing to see the patient (eg, review of tests), obtaining and/or reviewing separately obtained history, documenting clinical information in the electronic or other health record, independently interpreting results and communicating results to the patient/family/caregiver, or care coordinator.